# Patient Record
Sex: MALE | Race: WHITE
[De-identification: names, ages, dates, MRNs, and addresses within clinical notes are randomized per-mention and may not be internally consistent; named-entity substitution may affect disease eponyms.]

---

## 2021-07-08 ENCOUNTER — HOSPITAL ENCOUNTER (OUTPATIENT)
Dept: HOSPITAL 35 - OR | Age: 86
Discharge: HOME | End: 2021-07-08
Attending: OPHTHALMOLOGY
Payer: MEDICARE

## 2021-07-08 VITALS — HEIGHT: 70.98 IN | WEIGHT: 200 LBS | BODY MASS INDEX: 28 KG/M2

## 2021-07-08 VITALS — SYSTOLIC BLOOD PRESSURE: 149 MMHG | DIASTOLIC BLOOD PRESSURE: 93 MMHG

## 2021-07-08 DIAGNOSIS — Z87.891: ICD-10-CM

## 2021-07-08 DIAGNOSIS — Z98.42: ICD-10-CM

## 2021-07-08 DIAGNOSIS — L57.0: Primary | ICD-10-CM

## 2021-07-08 DIAGNOSIS — Z85.828: ICD-10-CM

## 2021-07-08 DIAGNOSIS — Z98.890: ICD-10-CM

## 2021-07-08 DIAGNOSIS — Z98.41: ICD-10-CM

## 2021-07-08 DIAGNOSIS — Z96.653: ICD-10-CM

## 2021-07-08 DIAGNOSIS — I10: ICD-10-CM

## 2021-07-08 DIAGNOSIS — Z79.899: ICD-10-CM

## 2021-07-08 DIAGNOSIS — Z85.46: ICD-10-CM

## 2021-07-08 DIAGNOSIS — J43.9: ICD-10-CM

## 2021-07-08 PROCEDURE — 50010 RENAL EXPLORATION: CPT

## 2021-07-08 PROCEDURE — 62110: CPT

## 2021-07-08 PROCEDURE — 50386 REMOVE STENT VIA TRANSURETH: CPT

## 2021-07-08 PROCEDURE — 62850: CPT

## 2021-07-08 PROCEDURE — 70005: CPT

## 2021-07-08 PROCEDURE — 50101: CPT

## 2021-07-08 PROCEDURE — 50398: CPT

## 2021-07-08 PROCEDURE — 56531: CPT

## 2021-07-08 PROCEDURE — 51636: CPT

## 2021-07-08 NOTE — O
[facility]
[place]   [address]                     OPERATIVE REPORT              
_______________________________________________________________________________
 
Name:       JACINTA TORRES              Room #:                     REG [facility].#:      [id number]                       Account #:      [id number]  
Admission:  07/08/21    Attend Phys:    Yaw Calix MD  
Discharge:              Date of Birth:  [birth year]  
                                                          Report #: [id number]
                                                                    [id number] 
_______________________________________________________________________________
THIS REPORT FOR:  
 
cc:  Catracho oYung MD,Yaw Landeros MD, MD                                          ~
 
DOC #: [id number]
 
cc:     Enio Rodriguez MD, MD Yaw Carlos MD
DATE OF SERVICE: 07/08/2021
 
PREOPERATIVE DIAGNOSIS:  Basal cell carcinoma of right lower lid and cheek.
 
POSTOPERATIVE DIAGNOSIS:  Basal cell carcinoma of right lower lid and cheek.
 
PROCEDURE:  Excision of basal cell carcinoma of right lower lid and cheek with 
frozen section control of margins and myocutaneous flap repair of defect.
 
SURGEON:  Yaw Calix MD
 
ASSISTANT:  None.
 
ANESTHESIA:  MAC.
 
COMPLICATIONS:  None.
 
INDICATIONS FOR SURGERY:  This pleasant 85-year-old gentleman presents with a 
biopsy proven basal cell carcinoma of his right lower lid and cheek.  Today's 
procedure is being undertaken in order to remove the remaining tumor with frozen
section, confirmation of tumor, extirpation and subsequent reconstruction of 
that defect.  Informed consent was obtained to include but not limited to the 
potential risk for loss of vision, bleeding, infection, failure to improve the 
problem, and the potential need for further surgery or treatment.
 
DESCRIPTION OF PROCEDURE:  The patient was taken to the operating room where 2% 
Xylocaine with epinephrine mixed with equal parts 0.75% Marcaine with Wydase was
administered transcutaneously to the right lower lid, the right medial canthus, 
the bridge of the nose, the right cheek and the right lateral canthus.  The 
patient was subsequently prepped and draped in the usual sterile fashion.  A 
fine tip skin marking pen was then utilized to outline the lesion including 
approximately 2 mm of normal appearing tissue around its margins.  This included
the entire prior biopsy site.  Incisions were then made with a 15 blade and 
taken down to the subcutaneous muscular layer.  Hemostasis was achieved with 
diligent pinpoint monopolar cautery.  The specimen was then excised deeply 
utilizing Susan scissors.  It was then oriented on a drawing for the [address]   [address]                     OPERATIVE REPORT              
_______________________________________________________________________________
 
Name:       MELISSAJACINTA KELLI              Room #:                     REG [facility].#:      [id number]                       Account #:      [id number]  
Admission:  07/08/21    Attend Phys:    Yaw Calix MD  
Discharge:              Date of Birth:  [birth year]  
                                                          Report #: [id number]
                                                                    [id number] 
_______________________________________________________________________________
 
pathologist as hemostasis was achieved in the field.  The pathologist snap froze
that tissue and found that our margins were clear.
 
A myocutaneous flap was then developed inferolaterally to be rotated 
superomedially.  Hemostasis was then re-achieved.  The flap was then advanced 
and secured with multiple interrupted 6-0 plain gut sutures.  The wounds were 
then cleaned and dressed with erythromycin ophthalmic ointment.  The patient 
subsequently transported to the recovery area, having tolerated the procedures 
well with no anesthetic or operative complications being noted.
 
MD MIESHA Fields/DANY
 
D: 07/08/2021 10:13 AM
T: 07/08/2021 10:37 AM
 
 
 
 
 
 
 
 
 
 
 
 
 
 
 
 
 
 
 
 
 
 
 
 
 
 
 
 
                         
   By:                               
                   
D: 07/08/21 0913                           _____________________________________
T: 07/08/21 0937                           Yaw Calix MD            /nt

## 2021-07-09 NOTE — PATH
[facility]
[address] [place]   [address]                     PATHOLOGY RPT PROCEDURE       
_______________________________________________________________________________
 
Name:       JACINTA TORRES              Room #:                     REG [facility]..#:      [id number]     Account #:      [id number]  
Admission:  07/08/21    Date of Birth:  [birth year]  
Discharge:                                              Report #:    [id number]
                                                        Path Case #: [id number]
_______________________________________________________________________________
 
[facility] Accession Number: [id number]
.                                                                01
Material submitted:                                        .
lid - RIGHT LOWER LID LESION FS. Modifiers: right, lower
.                                                                01
Clinical history:                                          .
EXCISION BASAL CELL CARCINOMA
[facility]
.                                                                02
Frozen section diagnosis:                                       .
INTRAOPERATIVE CONSULTATION WITH FROZEN SECTION:
(Dr. Jena Ruiz)
.
FSA1.  Right lower lid lesion, excision:
- Negative for invasive carcinoma at margins on FS slide.
.
These findings are discussed with Dr. Yaw Calix in OR6 and a written
report is placed in the patient's chart.
(IUV:mml; 07/08/2021)
.
.
FROZEN SECTION GROSS DESCRIPTION:
Received fresh from the OR labeled, "Jacinta Torres - Right lower lid
lesion" is an oriented ellipse of skin measuring 1.7 x 0.6 x 0.6 cm.  It
is oriented as superior, medial, inferior and lateral.  The inferior
margin is inked black, the lateral to superior margin is inked blue, and
the superior to medial margin is inked green.  The deep margin is inked
black.  At this point the specimen is serially sectioned and entirely
submitted for frozen section as FSA1, subsequently submitted for permanent
section as A1.
(IUV:mml; 07/08/2021)
.
.
Frozen section performed at [facility], [address], [place] [address].
IZV/QLM
.                                                                02
**********************************************************************
Diagnosis:
Skin, right lower lid lesion, excision:
- Hyperkeratotic actinic keratosis.
- No residual basal cell carcinoma present.
- Margins free of malignancy.
.
(IUV:mml; 07/09/2021)
QLM  07/09/2021  1123 Local
**********************************************************************
 
 
[facility]
[address] [place]   [address]                     PATHOLOGY RPT PROCEDURE       
_______________________________________________________________________________
 
Name:       JACINTA TORRES              Room #:                     REG [facility] 
MKEITH.#:      [id number]     Account #:      [id number]  
Admission:  07/08/21    Date of Birth:  [birth year]  
Discharge:                                              Report #:    [id number]
                                                        Path Case #: [id number]
_______________________________________________________________________________
.                                                                02
Electronically signed:                                     .
Jena Ruiz MD, Pathologist
NPI- [id number]
.                                                                01
Gross description:                                         .
PLEASE SEE FROZEN SECTION GROSS DESCRIPTION
/QLM  07/08/2021  1234 Local
.                                                                02
Pathologist provided ICD-10:
L57.0, C44.1122
.                                                                02
CPT                                                        .
386712, [id number]
Specimen Comment: A courtesy copy of this report has been sent to [phone]
[phone]
Specimen Comment: [phone]
Specimen Comment: Report sent to  / DR BECERRA
***Performed at:  01
   [address], [place]  [id number]
   MD Lambert Lopez MD Phone:  [phone]
***Performed at:  02
   [address]  [id number]
   MD Jena Ruiz MD Phone:  [phone]